# Patient Record
Sex: FEMALE | HISPANIC OR LATINO | Employment: PART TIME | URBAN - METROPOLITAN AREA
[De-identification: names, ages, dates, MRNs, and addresses within clinical notes are randomized per-mention and may not be internally consistent; named-entity substitution may affect disease eponyms.]

---

## 2021-05-25 ENCOUNTER — NURSE TRIAGE (OUTPATIENT)
Dept: NURSING | Facility: CLINIC | Age: 21
End: 2021-05-25

## 2021-05-26 NOTE — TELEPHONE ENCOUNTER
Developed a rash today - had it last fall after hiking. This time it has spread to her back and progressively gotten worse (went hammocking earlier today) - keeps spreading. Benadryl cream has made it worse.     Per protocol advised PCP evaluation - will call Melba in the morning.     Brittany Mccallum RN on 5/25/2021 at 9:15 PM    Reason for Disposition    SEVERE itching (i.e., interferes with sleep, normal activities or school)    Additional Information    Negative: [1] Life-threatening reaction (anaphylaxis) in the past to similar substance (e.g., food, insect bite/sting, chemical, etc.) AND [2] < 2 hours since exposure    Negative: [1] Sudden onset of rash (within last 2 hours) AND [2] difficulty with breathing or swallowing    Negative: Shock suspected (e.g., cold/pale/clammy skin, too weak to stand, low BP, rapid pulse)    Negative: Difficult to awaken or acting confused (e.g., disoriented, slurred speech)    Negative: [1] Purple or blood-colored spots or dots AND [2] fever    Negative: Sounds like a life-threatening emergency to the triager    Negative: Insect bites suspected    Negative: Swimmer's Itch suspected    Negative: Sunburn suspected    Negative: Hives suspected    Negative: Measles suspected AND [2] known exposure to measles in past 3 weeks    Negative: [1] Chickenpox suspected AND [2] known exposure to chickenpox in past 3 weeks    Negative: [1] Drug rash suspected AND [2] started taking new medicine within last 2 weeks(Exception: antihistamine, eye drops, ear drops, decongestant or other OTC cough/cold medicines)    Negative: [1] Widespread rash AND [2] bright red, sunburn-like AND [3] current tampon use or nasal packing    Negative: [1] Widespread rash AND [2] bright red, sunburn-like AND [3] wound infection or recent surgery    Negative: [1] Bright red skin AND [2] peels off in sheets    Negative: Stiff neck (unable to touch chin to chest)    Negative: Fever    Negative: Joint pain or swelling     Negative: Rash looks like large or small blisters (i.e., fluid filled bubbles or sacs on the skin)    Negative: Patient sounds very sick or weak to the triager    Negative: [1] Purple or blood-colored rash (spots or dots) AND [2] no fever AND [3] sounds well to triager    Negative: Sores in mouth    Negative: Face becomes swollen    Negative: [1] Headache AND [2] no fever    Protocols used: RASH OR REDNESS - WIDESPREAD-A-AH

## 2024-05-25 ENCOUNTER — HOSPITAL ENCOUNTER (EMERGENCY)
Facility: CLINIC | Age: 24
Discharge: HOME | End: 2024-05-25
Attending: STUDENT IN AN ORGANIZED HEALTH CARE EDUCATION/TRAINING PROGRAM | Admitting: STUDENT IN AN ORGANIZED HEALTH CARE EDUCATION/TRAINING PROGRAM
Payer: COMMERCIAL

## 2024-05-25 VITALS
SYSTOLIC BLOOD PRESSURE: 111 MMHG | HEART RATE: 93 BPM | DIASTOLIC BLOOD PRESSURE: 78 MMHG | TEMPERATURE: 99.8 F | WEIGHT: 110 LBS | OXYGEN SATURATION: 98 % | RESPIRATION RATE: 16 BRPM

## 2024-05-25 DIAGNOSIS — T78.40XA HYPERSENSITIVITY REACTION, INITIAL ENCOUNTER: ICD-10-CM

## 2024-05-25 PROCEDURE — 36415 COLL VENOUS BLD VENIPUNCTURE: CPT | Performed by: STUDENT IN AN ORGANIZED HEALTH CARE EDUCATION/TRAINING PROGRAM

## 2024-05-25 PROCEDURE — 99283 EMERGENCY DEPT VISIT LOW MDM: CPT

## 2024-05-25 ASSESSMENT — ACTIVITIES OF DAILY LIVING (ADL)
ADLS_ACUITY_SCORE: 33
ADLS_ACUITY_SCORE: 33

## 2024-05-25 ASSESSMENT — COLUMBIA-SUICIDE SEVERITY RATING SCALE - C-SSRS
1. IN THE PAST MONTH, HAVE YOU WISHED YOU WERE DEAD OR WISHED YOU COULD GO TO SLEEP AND NOT WAKE UP?: NO
6. HAVE YOU EVER DONE ANYTHING, STARTED TO DO ANYTHING, OR PREPARED TO DO ANYTHING TO END YOUR LIFE?: NO
2. HAVE YOU ACTUALLY HAD ANY THOUGHTS OF KILLING YOURSELF IN THE PAST MONTH?: NO

## 2024-05-26 NOTE — ED TRIAGE NOTES
Pt reports she started to get red pinpoint rash in clusters to arms, legs, upper chest and back while she was on vacation in [place] on 5/23 - getting worse. Denies sob or throat discomfort.      Triage Assessment (Adult)       Row Name 05/25/24 2057          Respiratory WDL    Respiratory WDL WDL        Cardiac WDL    Cardiac WDL WDL        Cognitive/Neuro/Behavioral WDL    Cognitive/Neuro/Behavioral WDL WDL

## 2024-05-26 NOTE — ED PROVIDER NOTES
Emergency Department Note      History of Present Illness     Chief Complaint  Rash    HPI  Kristy Bonner is a 24 year old female who presets to the ED for an evaluation of a rash. The patient reports that she just came back from a week long trip to [place]. She notes that she went snorkeling. She notes that she first noticed a bug bite on her neck and started to notice a rash on her hand on 05/23/2024. She states that the rash spread to her neck, upper arms and legs for the past few days. She complains of itchiness. She took Claritin with no relief.  She denies any flu like symptoms. No known allergy.     Independent Historian  Patient, as per HPI.     Review of External Notes  None  Past Medical History   Medical History and Problem List  No past medical history.    Medications  The patient is currently on no regular medications.    Physical Exam   Patient Vitals for the past 24 hrs:   BP Temp Pulse Resp SpO2 Weight   05/25/24 2057 111/78 99.8  F (37.7  C) 93 16 98 % 49.9 kg (110 lb)     Physical Exam  General: Alert and cooperative with exam. Patient in no apparent distress. Normal mentation.  Head:  Scalp is NC/AT  Eyes:  No scleral icterus, PERRL  ENT:  The external nose and ears are normal.  Neck:  Normal range of motion without rigidity.  CV:  Regular rate and rhythm    No pathologic murmur   Resp:  Breath sounds are clear bilaterally    Non-labored, no retractions or accessory muscle use  GI:  Abdomen is soft, no distension, no tenderness. No peritoneal signs  MS:  No lower extremity edema   Skin:  Diffuse erythematous papules present to entirety of bilateral upper extremities with extension into upper back and neck. Abdomen, face, palms, soles, and lower extremities spared.  Pruritic  Neuro:  Oriented x 3. No gross motor deficits.    Diagnostics   Independent Interpretation  None  ED Course    Medications Administered  Medications - No data to display      Discussion of Management  None    Social  Determinants of Health adding to complexity of care  None    ED Course  ED Course as of 05/25/24 2222   Sat May 25, 2024   2155 I have obtained history and evaluated the patient.      Medical Decision Making / Diagnosis   DARLIN Bonner is a 24 year old female who presents with diffuse rash to bilateral upper extremities, upper back, neck.  Recent trip to [place], returned 2 days ago.  No fever, shortness of breath, difficulty breathing, body aches, chills, nausea or vomiting.  Considered etiologies including hypersensitivity rash to sun or insect bite, allergic reaction, viral exanthem, or possible dengue fever given that [place] is currently experiencing a daily outbreak.  She is afebrile without any systemic symptoms outside of rash to suggest dengue fever however will test for this today out of abundance of caution as patient did believe she had new bug bite during her trip.  No prodromal symptoms of fever, intraoral lesions, malaise or cough to suggest measles.  High suspicion for hypersensitivity reaction and encourage patient to continue antihistamines for symptomatic relief, oatmeal baths, and unscented lotions.  Primary care referral was provided today for her to have follow-up and reassessment of her rash to ensure symptomatic improvement.  If any symptoms worsen, return to ER.    Disposition  The patient was discharged.     ICD-10 Codes:    ICD-10-CM    1. Hypersensitivity reaction, initial encounter  T78.40XA Primary Care Referral           Discharge Medications  There are no discharge medications for this patient.        Scribe Disclosure:  I, Emilia Brothers, am serving as a scribe at 9:55 PM on 5/25/2024 to document services personally performed by Irena Knight PA-C based on my observations and the provider's statements to me.        Irena Knight PA-C  05/25/24 2222

## 2024-05-26 NOTE — DISCHARGE INSTRUCTIONS
Thankfully only symptomatic cares are indicated at this time. Continue claritin or benadryl for ongoing itch. I also recommend oatmeal baths or soothing lotions. I have placed a primary care referral for follow up and reassessment.     If dengue testing returns positive, we will call to inform you. Could also be delayed sun exposure rash or hypersensitivity to bug bite. Continue monitor your symptoms.  If you develop fever, been taking ibuprofen and Tylenol.  If fever remains elevated above 103 with use of these medications, return to ER.  Try to plan rest and hydration.

## 2024-06-19 ENCOUNTER — OFFICE VISIT (OUTPATIENT)
Dept: FAMILY MEDICINE | Facility: CLINIC | Age: 24
End: 2024-06-19
Payer: COMMERCIAL

## 2024-06-19 VITALS
WEIGHT: 109.5 LBS | DIASTOLIC BLOOD PRESSURE: 67 MMHG | HEIGHT: 62 IN | HEART RATE: 79 BPM | SYSTOLIC BLOOD PRESSURE: 103 MMHG | TEMPERATURE: 98.8 F | OXYGEN SATURATION: 100 % | BODY MASS INDEX: 20.15 KG/M2 | RESPIRATION RATE: 15 BRPM

## 2024-06-19 DIAGNOSIS — Z11.59 NEED FOR HEPATITIS C SCREENING TEST: ICD-10-CM

## 2024-06-19 DIAGNOSIS — Z12.4 CERVICAL CANCER SCREENING: ICD-10-CM

## 2024-06-19 DIAGNOSIS — L30.9 DERMATITIS: ICD-10-CM

## 2024-06-19 DIAGNOSIS — Z11.4 SCREENING FOR HIV (HUMAN IMMUNODEFICIENCY VIRUS): ICD-10-CM

## 2024-06-19 DIAGNOSIS — Z11.3 SCREENING FOR STDS (SEXUALLY TRANSMITTED DISEASES): Primary | ICD-10-CM

## 2024-06-19 PROCEDURE — 99203 OFFICE O/P NEW LOW 30 MIN: CPT

## 2024-06-19 RX ORDER — TRIAMCINOLONE ACETONIDE 1 MG/G
OINTMENT TOPICAL 2 TIMES DAILY
Qty: 30 G | Refills: 0 | Status: SHIPPED | OUTPATIENT
Start: 2024-06-19

## 2024-06-19 ASSESSMENT — PAIN SCALES - GENERAL: PAINLEVEL: NO PAIN (0)

## 2024-06-19 NOTE — PROGRESS NOTES
"  Assessment & Plan     Dermatitis  - triamcinolone (KENALOG) 0.1 % external ointment; Apply topically 2 times daily 2 weeks on. 2 weeks off  Patient's rash may have fit with either allergic reaction versus contact dermatitis.  She did try a new sunscreen while she is was in [place] that may have contributed to her symptoms.  Symptoms have mostly resolved with the exception of a scaly rash above the bilateral elbows with some hypopigmentation that may be a marker for post inflammation.  I will prescribe her with the triamcinolone and advised her to follow-up with us if symptoms are not improving.      MED REC REQUIRED  Post Medication Reconciliation Status:         Luciano Wagner is a 24 year old, presenting for the following health issues:  ER F/U      6/19/2024    12:53 PM   Additional Questions   Roomed by Junito RIZO   Went to week long trip of [facility], had a bug bite on neck and rash on shoulders, arms, neck. Went to ER in May, and was diagnosed with hypersensitivity reaction.  No changes in shirts, clothing, jewelry.  No fevers/joint pain.   HPI     ED/UC Followup:    Facility:  [facility] Emergency Dept   Date of visit: 05/25/2024  Reason for visit: Hypersensitivity reaction, initial encounter   Current Status: \"doing good\" - pt reports that the rash lasted a little less than a week and then started to heal. Pt reports that the rash has been starting to return on her arm and neck, but not as bad as it was.         Objective    /67 (BP Location: Right arm, Patient Position: Sitting, Cuff Size: Adult Small)   Pulse 79   Temp 98.8  F (37.1  C) (Temporal)   Resp 15   Ht 1.578 m (5' 2.13\")   Wt 49.7 kg (109 lb 8 oz)   LMP 06/04/2024 (Exact Date)   SpO2 100%   BMI 19.95 kg/m    Body mass index is 19.95 kg/m .  Physical Exam   GENERAL: alert and no distress  SKIN: Red scaly papular rash noted above bilateral elbows with some hypopigmentation.  PSYCH: mentation appears normal, " affect normal/bright            Signed Electronically by: Yoshi Boggs, NP

## 2024-06-19 NOTE — COMMUNITY RESOURCES LIST (ENGLISH)
June 19, 2024           YOUR PERSONALIZED LIST OF SERVICES & PROGRAMS           & SHELTER    Housing      [facility] - Hotline - Housing crisis  [address] [place] [address] (Distance: 6.5 miles)  Phone: [phone]) [phone]  Language: English      Free - Client Services  [address] [place] [address] (Distance: 18.0 miles)  Phone: [phone]  Website: https://[employer].Zignals/  Language: English  Fee: Free  Transportation Options: Free transportation      [facility]  Phone: [phone]  Website: https://www.CoachUp/  Language: English, Hmong, Oromo, Central African, Cape Verdean  Hours: Mon 9:00 AM - 5:00 PM Tue 9:00 AM - 5:00 PM Wed 9:00 AM - 5:00 PM Thu 9:00 AM - 5:00 PM Fri 9:00 AM - 5:00 PM  Fee: Insurance  Accessibility: Blind accommodation, Deaf or hard of hearing, Translation services  Transportation Options: Free transportation    Case Management      [facility] - Housing Stabilization  [address] [place] [address] (Distance: 14.5 miles)  Phone: [phone]  Language: English  Fee: Self pay, Insurance  Accessibility: Translation services      [facility] - [facility]AP Rental Housing  [address] [place], [place] [address]e [address] [address] (Distance: 4.8 miles)  Phone: [phone]) [phone]  Language: English  Fee: Free  Accessibility: Ada accessible, Blind accommodation, Deaf or hard of hearing      [employer] - Housing Stabilization Services  Phone: [phone] [phone]  Website: https://homebasemn.com/  Language: English  Hours: Mon 8:00 AM - 4:00 PM Tue 8:00 AM - 4:00 PM Wed 8:00 AM - 4:00 PM Thu 8:00 AM - 4:00 PM Fri 8:00 AM - 4:00 PM  Fee: Free  Accessibility: Blind accommodation, Deaf or hard of hearing  Transportation Options: Free transportation    Drop-In Services      [employer]  [address] [place] [address] (Distance: 18.0 miles)  Phone: [phone]) [phone]   Language: English  Fee: Free      [facility] Warming or cooling center  [address] [place] [address] (Distance: 5.1 miles)  Phone: [phone]) [phone]  Language: Hmong, Emirati, English  Fee: Free  Accessibility: Ada accessible, Blind accommodation, Deaf or hard of hearing      [employer] - MAIL SERVICE FOR THE HOMELESS  Phone: (888) [phone]  Website: https://www.[employer]               IMPORTANT NUMBERS & WEBSITES        Emergency Services  911  .   United Way  211 http://211unitedway.org  .   Poison Control  (889) 123-6781 http://[place]poison.org http://wis[place]poison.org  .     Suicide and Crisis Lifeline  988 http://988Mandy & Pandyline.org  .   Childhelp [place] Child Abuse Hotline  468.553.8134 http://Childhelphotline.org   .   [place] Sexual Assault Hotline  (917) 964-4521 (HOPE) http://Compass.org   .     [place] Runaway Safeline  (809) 896-7541 (RUNAWAY) http://C[employer]aru[employer] Biosciences.Camera Agroalimentos  .   Pregnancy & Postpartum Support  Call/text 134-424-7799  [place]: http://ppsupport[place].org  [place]: http://Yext.com/wi  .   Substance Abuse National Helpline ([facility])  260-845-HELP (0729) http://Findtreatment.gov   .                DISCLAIMER: These resources have been generated via the [employer] Platform. [employer] does not endorse any service providers mentioned in this resource list. [employer] does not guarantee that the services mentioned in this resource list will be available to you or will improve your health or wellness.    [facility]